# Patient Record
Sex: FEMALE | Race: AMERICAN INDIAN OR ALASKA NATIVE | NOT HISPANIC OR LATINO | URBAN - METROPOLITAN AREA
[De-identification: names, ages, dates, MRNs, and addresses within clinical notes are randomized per-mention and may not be internally consistent; named-entity substitution may affect disease eponyms.]

---

## 2024-05-12 ENCOUNTER — HOSPITAL ENCOUNTER (INPATIENT)
Facility: MEDICAL CENTER | Age: 19
LOS: 2 days | End: 2024-05-14
Attending: OBSTETRICS & GYNECOLOGY | Admitting: OBSTETRICS & GYNECOLOGY
Payer: MEDICAID

## 2024-05-12 LAB
ABO GROUP BLD: NORMAL
BASOPHILS # BLD AUTO: 0.7 % (ref 0–1.8)
BASOPHILS # BLD: 0.08 K/UL (ref 0–0.12)
BLD GP AB SCN SERPL QL: NORMAL
EOSINOPHIL # BLD AUTO: 0.04 K/UL (ref 0–0.51)
EOSINOPHIL NFR BLD: 0.3 % (ref 0–6.9)
ERYTHROCYTE [DISTWIDTH] IN BLOOD BY AUTOMATED COUNT: 52 FL (ref 35.9–50)
ERYTHROCYTE [DISTWIDTH] IN BLOOD BY AUTOMATED COUNT: 52.3 FL (ref 35.9–50)
HBV SURFACE AG SER QL: ABNORMAL
HCT VFR BLD AUTO: 28 % (ref 37–47)
HCT VFR BLD AUTO: 31.3 % (ref 37–47)
HCV AB SER QL: ABNORMAL
HGB BLD-MCNC: 10.1 G/DL (ref 12–16)
HGB BLD-MCNC: 8.9 G/DL (ref 12–16)
HIV 1+2 AB+HIV1 P24 AG SERPL QL IA: NORMAL
HOLDING TUBE BB 8507: NORMAL
IMM GRANULOCYTES # BLD AUTO: 0.09 K/UL (ref 0–0.11)
IMM GRANULOCYTES NFR BLD AUTO: 0.8 % (ref 0–0.9)
LYMPHOCYTES # BLD AUTO: 3.58 K/UL (ref 1–4.8)
LYMPHOCYTES NFR BLD: 30.3 % (ref 22–41)
MCH RBC QN AUTO: 27.7 PG (ref 27–33)
MCH RBC QN AUTO: 28 PG (ref 27–33)
MCHC RBC AUTO-ENTMCNC: 31.8 G/DL (ref 32.2–35.5)
MCHC RBC AUTO-ENTMCNC: 32.3 G/DL (ref 32.2–35.5)
MCV RBC AUTO: 86.7 FL (ref 81.4–97.8)
MCV RBC AUTO: 87.2 FL (ref 81.4–97.8)
MONOCYTES # BLD AUTO: 0.84 K/UL (ref 0–0.85)
MONOCYTES NFR BLD AUTO: 7.1 % (ref 0–13.4)
NEUTROPHILS # BLD AUTO: 7.18 K/UL (ref 1.82–7.42)
NEUTROPHILS NFR BLD: 60.8 % (ref 44–72)
NRBC # BLD AUTO: 0 K/UL
NRBC BLD-RTO: 0 /100 WBC (ref 0–0.2)
PLATELET # BLD AUTO: 190 K/UL (ref 164–446)
PLATELET # BLD AUTO: 191 K/UL (ref 164–446)
PMV BLD AUTO: 11 FL (ref 9–12.9)
PMV BLD AUTO: 11 FL (ref 9–12.9)
RBC # BLD AUTO: 3.21 M/UL (ref 4.2–5.4)
RBC # BLD AUTO: 3.61 M/UL (ref 4.2–5.4)
RH BLD: NORMAL
RUBV AB SER QL: 23.8 IU/ML
T PALLIDUM AB SER QL IA: ABNORMAL
WBC # BLD AUTO: 11.8 K/UL (ref 4.8–10.8)
WBC # BLD AUTO: 16.1 K/UL (ref 4.8–10.8)

## 2024-05-12 PROCEDURE — 59409 OBSTETRICAL CARE: CPT | Performed by: OBSTETRICS & GYNECOLOGY

## 2024-05-12 RX ORDER — OXYTOCIN 10 [USP'U]/ML
INJECTION, SOLUTION INTRAMUSCULAR; INTRAVENOUS
Status: ACTIVE
Start: 2024-05-12 | End: 2024-05-12

## 2024-05-12 RX ORDER — IBUPROFEN 800 MG/1
800 TABLET ORAL EVERY 8 HOURS PRN
Status: DISCONTINUED | OUTPATIENT
Start: 2024-05-12 | End: 2024-05-14 | Stop reason: HOSPADM

## 2024-05-12 RX ORDER — METHYLERGONOVINE MALEATE 0.2 MG/ML
0.2 INJECTION INTRAVENOUS
Status: COMPLETED | OUTPATIENT
Start: 2024-05-12 | End: 2024-05-12

## 2024-05-12 RX ORDER — LIDOCAINE HYDROCHLORIDE 10 MG/ML
20 INJECTION, SOLUTION INFILTRATION; PERINEURAL
Status: DISCONTINUED | OUTPATIENT
Start: 2024-05-12 | End: 2024-05-12 | Stop reason: HOSPADM

## 2024-05-12 RX ORDER — IBUPROFEN 800 MG/1
800 TABLET ORAL
Status: COMPLETED | OUTPATIENT
Start: 2024-05-12 | End: 2024-05-12

## 2024-05-12 RX ORDER — SODIUM CHLORIDE, SODIUM LACTATE, POTASSIUM CHLORIDE, CALCIUM CHLORIDE 600; 310; 30; 20 MG/100ML; MG/100ML; MG/100ML; MG/100ML
INJECTION, SOLUTION INTRAVENOUS CONTINUOUS
Status: DISCONTINUED | OUTPATIENT
Start: 2024-05-12 | End: 2024-05-13

## 2024-05-12 RX ORDER — DOCUSATE SODIUM 100 MG/1
100 CAPSULE, LIQUID FILLED ORAL 2 TIMES DAILY PRN
Status: DISCONTINUED | OUTPATIENT
Start: 2024-05-12 | End: 2024-05-14 | Stop reason: HOSPADM

## 2024-05-12 RX ORDER — ACETAMINOPHEN 500 MG
1000 TABLET ORAL EVERY 6 HOURS PRN
Status: DISCONTINUED | OUTPATIENT
Start: 2024-05-12 | End: 2024-05-14 | Stop reason: HOSPADM

## 2024-05-12 RX ORDER — CARBOPROST TROMETHAMINE 250 UG/ML
250 INJECTION, SOLUTION INTRAMUSCULAR
Status: DISCONTINUED | OUTPATIENT
Start: 2024-05-12 | End: 2024-05-12 | Stop reason: HOSPADM

## 2024-05-12 RX ORDER — ACETAMINOPHEN 500 MG
1000 TABLET ORAL
Status: DISCONTINUED | OUTPATIENT
Start: 2024-05-12 | End: 2024-05-12 | Stop reason: HOSPADM

## 2024-05-12 RX ORDER — MISOPROSTOL 200 UG/1
800 TABLET ORAL
Status: COMPLETED | OUTPATIENT
Start: 2024-05-12 | End: 2024-05-12

## 2024-05-12 RX ORDER — OXYTOCIN 10 [USP'U]/ML
10 INJECTION, SOLUTION INTRAMUSCULAR; INTRAVENOUS
Status: DISCONTINUED | OUTPATIENT
Start: 2024-05-12 | End: 2024-05-12 | Stop reason: HOSPADM

## 2024-05-12 RX ORDER — TERBUTALINE SULFATE 1 MG/ML
0.25 INJECTION, SOLUTION SUBCUTANEOUS
Status: DISCONTINUED | OUTPATIENT
Start: 2024-05-12 | End: 2024-05-12 | Stop reason: HOSPADM

## 2024-05-12 RX ORDER — SODIUM CHLORIDE, SODIUM LACTATE, POTASSIUM CHLORIDE, CALCIUM CHLORIDE 600; 310; 30; 20 MG/100ML; MG/100ML; MG/100ML; MG/100ML
INJECTION, SOLUTION INTRAVENOUS PRN
Status: DISCONTINUED | OUTPATIENT
Start: 2024-05-12 | End: 2024-05-14 | Stop reason: HOSPADM

## 2024-05-12 RX ADMIN — ACETAMINOPHEN 1000 MG: 500 TABLET, FILM COATED ORAL at 20:55

## 2024-05-12 RX ADMIN — OXYTOCIN 20 UNITS: 10 INJECTION, SOLUTION INTRAMUSCULAR; INTRAVENOUS at 09:08

## 2024-05-12 RX ADMIN — MISOPROSTOL 800 MCG: 200 TABLET ORAL at 08:42

## 2024-05-12 RX ADMIN — ACETAMINOPHEN 1000 MG: 500 TABLET, FILM COATED ORAL at 11:43

## 2024-05-12 RX ADMIN — IBUPROFEN 800 MG: 800 TABLET, FILM COATED ORAL at 09:22

## 2024-05-12 RX ADMIN — IBUPROFEN 800 MG: 800 TABLET, FILM COATED ORAL at 22:38

## 2024-05-12 RX ADMIN — OXYTOCIN 125 ML/HR: 10 INJECTION, SOLUTION INTRAMUSCULAR; INTRAVENOUS at 10:37

## 2024-05-12 RX ADMIN — METHYLERGONOVINE MALEATE 0.2 MG: 0.2 INJECTION INTRAVENOUS at 09:45

## 2024-05-12 ASSESSMENT — PAIN DESCRIPTION - PAIN TYPE
TYPE: ACUTE PAIN

## 2024-05-12 ASSESSMENT — LIFESTYLE VARIABLES: EVER_SMOKED: NEVER

## 2024-05-12 ASSESSMENT — PATIENT HEALTH QUESTIONNAIRE - PHQ9
1. LITTLE INTEREST OR PLEASURE IN DOING THINGS: NOT AT ALL
2. FEELING DOWN, DEPRESSED, IRRITABLE, OR HOPELESS: NOT AT ALL
SUM OF ALL RESPONSES TO PHQ9 QUESTIONS 1 AND 2: 0

## 2024-05-12 NOTE — PROGRESS NOTES
0827: Pt arrived complaining of painful contractions and urge to push. Pt brought to labor room for assessment. Pt reports she is  with no prenatal care with any of her pregnancies. Pt reports she had a positive pregnancy test in September.     0829: SVE complete. SROM clear fluid.     0836: Dr Bowman at bedside. Delivery of viable female infant.     0841: Delivery of intact placenta.    Pt up to bathroom, brennan care done. Pt able to void.     1104: Pt into wheelchair, up to postpartum with infant in arms. Report to Abigail MACE. POC discussed.

## 2024-05-12 NOTE — CARE PLAN
The patient is Stable - Low risk of patient condition declining or worsening    Shift Goals  Clinical Goals: VSS, pain WDL, lochia WDL    Progress made toward(s) clinical / shift goals:  VSS, lochia light, pain controlled with PRN medications and rest      Problem: Knowledge Deficit - Postpartum  Goal: Patient will verbalize and demonstrate understanding of self and infant care  Outcome: Progressing     Problem: Psychosocial - Postpartum  Goal: Patient will verbalize and demonstrate effective bonding and parenting behavior  Outcome: Progressing       Patient is not progressing towards the following goals:

## 2024-05-12 NOTE — L&D DELIVERY NOTE
Vaginal Delivery Procedure Note:    Cande Harrell is a 18 y.o. , admitted for active labor.  Her labor course was precipitous. Arrived with complete cervical dilation and regular contractions, progressed to  of viable female infant.    PreDelivery Diagnosis:  1. SIUP @ Unknown  2. Lack of prenatal care during pregnancy    PostDelivery Diagnosis:  1. S/p   2. Lack of prenatal care curing pregnancy    Procedure in Detail:  Pt pushing dorsal lithotomy position.  Head delivered easily and restituted towards maternal left.  Anterior shoulder followed easily with gentle downwards pressure followed by the posterior shoulder and body.  female infant delivered @ 08:36.  There was no nuchal cord.  Infant was placed on the maternal abdomen and was stimulated and bulb suctioned.  Cord clamping was delayed x60 seconds then the cord was clamped x2 and cut.  Infant APGARs 8 and 9 at 1 and 5 minutes, respectively.  Birth weight pending.  Cord gases were not sent.  IV pitocin bolus started.  Placenta delivered spontaneously intact at 08:41. 3 Vessel cord.  The vagina and perineum were examined revealing no tears or lacerations.  The uterus was firm with IV pitocin and fundal massage. Patient noted to have continued bleeding from uterus, received 1x IM methergine and 800mg rectal cytotec. Bleeding resolved after continued fundal massage. Pt and infant left bonding in LDR.    EBL 400ml  Anesthesia - None  Sponge and needle counts correct.  Patient tolerated procedure well.    Anticipate routine postpartum care.    Dalia Rojas M.D.  [facility]  PGY-1

## 2024-05-12 NOTE — H&P
"OB H&P:    CC: Active Labor    HPI:  Ms. Cande Harrell is a 18 y.o.  @ Unknown GA who arrived to L&D on 24 at 08:27 with painful contractions, in active labor. She has not had any prenatal care during this pregnancy or either of her two prior pregnancies. Had positive pregnancy test in September.     Patient states that she occasionally receives care for acute concerns with the [facility] but otherwise does not have a PCP.     Contractions: Yes   Loss of fluid: Yes   Vaginal bleeding: No   Fetal movement: present    Patient did not receive PNC during this pregnancy or either of her two prior pregnancies.     PNL:  No prenatal labs on file  Glucola: No prenatal care  GBS unknown    ROS:  Const: denies fevers, general concerns  CV/resp: reports no concerns  GI: denies abd pain, GI concerns  : see HPI  Neuro: denies HA/vision changes    OB History    Para Term  AB Living   3 2       2   SAB IAB Ectopic Molar Multiple Live Births                    # Outcome Date GA Lbr Aneudy/2nd Weight Sex Delivery Anes PTL Lv   3 Current            2 Para            1 Para                GYN: denies STIs, no cervical procedures    History reviewed. No pertinent past medical history.    History reviewed. No pertinent surgical history.    No current facility-administered medications on file prior to encounter.     No current outpatient medications on file prior to encounter.       History reviewed. No pertinent family history.    Social History     Tobacco Use    Smoking status: Never    Smokeless tobacco: Never   Vaping Use    Vaping Use: Never used   Substance Use Topics    Alcohol use: Not Currently    Drug use: Never     PE:  Vitals:    24 0800 24 0900   BP:  116/58   Pulse:  72   Weight: 65.8 kg (145 lb)    Height: 1.651 m (5' 5\")      gen: AAO, experiencing painful contractions in active labor  abd: soft, gravid, NT, EFW unable to assess  Ext: NT, no bilateral lower " extremity edema    SVE: complete @ 08:29 per RN, SROM during SVE with clear fluid  FHT: approximately 8 min of FHT prior to precipitous delivery, unable to assess    A/P: 18 y.o.  @ Unknown GA who arrived to L&D on 24 at 08:27 with painful contractions, in active labor.    #Active Labor  - Patient arrived in active labor, complete cervical dilation on cervical exam, bulging membranes noted with SROM with clear fluid during exam  - Precipitous delivery of viable female infant  - Received 1x IM Methergine and 800mg rectal cytotec for postpartum bleeding, resolved after fundal massage, EBL approximately 400ml    #Lack of Prenatal Care  - Ordered prenatal labs, drawn post delivery given precipitous birth  - Prenatal Labs: Rh pending, RI, HIV neg, TrepAb neg, HBsAg NR, Hepatitis C NR, GC/CT unknown. GBS: unknown. Blood Type: pending    #GBS unknown  - Delivery precipitous, did not receive antibiotics prior to delivery    #HCM: Did not receive tdap or influenza vaccines    Dalia Rojas M.D.  PGY-1  [facility]

## 2024-05-12 NOTE — PROGRESS NOTES
Patient arrive to S315 via wheelchair with L&D RN. Report received from Faiza DARLING RN. Assessment complete. Denies pain at this time. Patient oriented to room, call light, infant security, emergency light, and unit routine. Encourage to call for assistance. Visiting policy discussed.

## 2024-05-13 RX ADMIN — ACETAMINOPHEN 1000 MG: 500 TABLET, FILM COATED ORAL at 04:12

## 2024-05-13 RX ADMIN — ACETAMINOPHEN 1000 MG: 500 TABLET, FILM COATED ORAL at 09:06

## 2024-05-13 RX ADMIN — IBUPROFEN 800 MG: 800 TABLET, FILM COATED ORAL at 19:43

## 2024-05-13 ASSESSMENT — EDINBURGH POSTNATAL DEPRESSION SCALE (EPDS)
I HAVE BEEN SO UNHAPPY THAT I HAVE HAD DIFFICULTY SLEEPING: NOT AT ALL
I HAVE FELT SAD OR MISERABLE: NO, NOT AT ALL
I HAVE BEEN ABLE TO LAUGH AND SEE THE FUNNY SIDE OF THINGS: AS MUCH AS I ALWAYS COULD
THE THOUGHT OF HARMING MYSELF HAS OCCURRED TO ME: NEVER
I HAVE FELT SCARED OR PANICKY FOR NO GOOD REASON: NO, NOT AT ALL
I HAVE BEEN SO UNHAPPY THAT I HAVE BEEN CRYING: NO, NEVER
THINGS HAVE BEEN GETTING ON TOP OF ME: NO, MOST OF THE TIME I HAVE COPED QUITE WELL
I HAVE LOOKED FORWARD WITH ENJOYMENT TO THINGS: AS MUCH AS I EVER DID
I HAVE BEEN ANXIOUS OR WORRIED FOR NO GOOD REASON: HARDLY EVER
I HAVE BLAMED MYSELF UNNECESSARILY WHEN THINGS WENT WRONG: NO, NEVER

## 2024-05-13 ASSESSMENT — PAIN DESCRIPTION - PAIN TYPE
TYPE: ACUTE PAIN

## 2024-05-13 NOTE — CARE PLAN
The patient is Stable - Low risk of patient condition declining or worsening    Shift Goals  Clinical Goals: remain clinically stable  Patient Goals: pain management, rest  Family Goals: NAVEEN    Progress made toward(s) clinical / shift goals:  Patient has scant to light lochia with a firm palpable uterus.  Vital signs are within defined limits.  Patient will ask for pain medication when needed.  Assessment will continue.     Patient is not progressing towards the following goals:

## 2024-05-13 NOTE — CARE PLAN
The patient is Stable - Low risk of patient condition declining or worsening    Shift Goals  Clinical Goals: fundus and lochia WNL, pain management  Patient Goals: pain management, rest  Family Goals: NAVEEN    Progress made toward(s) clinical / shift goals: Fundus firm, lochia scant. Pt's pain managed with prn pain medications and non-pharmacological methods. Pt resting.    Problem: Psychosocial - Postpartum  Goal: Patient will verbalize and demonstrate effective bonding and parenting behavior  Outcome: Progressing     Problem: Altered Physiologic Condition  Goal: Patient physiologically stable as evidenced by normal lochia, palpable uterine involution and vitals within normal limits  Outcome: Progressing     Patient is not progressing towards the following goals: NA

## 2024-05-13 NOTE — DISCHARGE PLANNING
Discharge Planning Assessment Post Partum    Completed chart review    Reason for Referral: No prenatal care  Address: [address] in [place]  Type of Living Situation:stable - MOB lives with her  mother  Mom Diagnosis: post partum  Baby Diagnosis:   Primary Language: english    Name of Baby: Adeline Soriano  Father of the Baby: not involved  Involved in baby’s care? no  Contact Information: no    Prenatal Care: no - mother has 2 other children and did not have transportation to get PNC - discussed importance of care for . Mother expressed understanding.   [facility]'s PCP: [facility]  PCP for new baby:[facility] - discussed establishing other children with [facility] as well    Support System: family  Coping/Bonding between mother & baby: appropriate  Source of Feeding: breast  Supplies for Infant: prepared    Mom's Insurance: Medicaid, [employer]  Baby Covered on Insurance:yes  Mother Employed/School: no  Other children in the home/names & ages: Marek 1 year old, Isa 2 year old    Financial Hardship/Income: MOB mother support , receives TANF, Foodstamps, plans to apply for WIC  Mom's Mental status: alert and oriented  Services used prior to admit: TANF foodstamps, Medicaid     C[facility] History: [facility]. She denies any allegation of abuse/neglect  Psychiatric History: denies  Domestic Violence History: denies  Drug/ETOH History: denies    Resources Provided: community resources, PP support, [facility], [facility]  Referrals Made: diaper bank     Clearance for Discharge: Plan for infant to discharge home to parent when ready.

## 2024-05-13 NOTE — PROGRESS NOTES
Assessment complete, pt resting in bed at this time. Fundus firm, lochia scant. Pain controlled with prn medications per MAR. Pt states voiding without difficulty. POC discussed. Call light in reach of pt, encouraged to call for assistance.

## 2024-05-13 NOTE — LACTATION NOTE
Attempted to visit patient but she was asleep. Her nurse Darlyn reports that she has bottle fed formula to her baby so far today.Darlyn will notify me when Cande awakens.

## 2024-05-13 NOTE — PROGRESS NOTES
Obstetrics & Gynecology Post-Delivery Progress Note    Date of Service  2024    18 y.o.  s/p vaginal, spontaneous  Delivery date: 2024    Events  Patient had no prenatal care for this pregnancy, delivered precipitously.     Subjective  Pain: No  Bleeding: lochia minimal  PO's: taking regular diet  Voiding: without difficulty  Ambulating: yes  Passing flatus: Yes  Feeding: bottlefeeding    Objective  Temp:  [36.4 °C (97.6 °F)-36.8 °C (98.3 °F)] 36.4 °C (97.6 °F)  Pulse:  [61-73] 68  Resp:  [16-17] 16  BP: ()/(53-69) 105/60  SpO2:  [95 %-98 %] 95 %    Physical Exam  General: well  Chest/Breasts: nipples intact   Abdomen: non-distended  Fundus: at umbilicus  Incision: not applicable, (vaginal delivery)  Perineum: deferred  Extremities: symmetric, calves nontender    Recent Labs     24  0845 24  1851   WBC 11.8* 16.1*   RBC 3.61* 3.21*   HEMOGLOBIN 10.1* 8.9*   HEMATOCRIT 31.3* 28.0*   MCV 86.7 87.2   MCH 28.0 27.7   RDW 52.3* 52.0*   PLATELETCT 191 190   MPV 11.0 11.0   NEUTSPOLYS 60.80  --    LYMPHOCYTES 30.30  --    MONOCYTES 7.10  --    EOSINOPHILS 0.30  --    BASOPHILS 0.70  --        Assessment/Plan  Cande Harrell is a 18 y.o.yo  s/p postpartum day #1  s/p vaginal, spontaneous    1. Post care: meeting all goals  2. Hemodynamics: stable  3. Pain:  reports abdominal pain that is   4. Rh+, Rubella Immune  5. Method of Feeding: plans to bottle feed  6. Method of Contraception:  has not considered, though does not want more children. Discussed options for LARCs. Please disc tomorrow before discharge.  7. Disposition: likely home postpartum day 1    VTE prophylaxis: none indicated

## 2024-05-14 VITALS
HEART RATE: 73 BPM | HEIGHT: 65 IN | DIASTOLIC BLOOD PRESSURE: 60 MMHG | RESPIRATION RATE: 16 BRPM | WEIGHT: 145 LBS | OXYGEN SATURATION: 98 % | SYSTOLIC BLOOD PRESSURE: 98 MMHG | BODY MASS INDEX: 24.16 KG/M2 | TEMPERATURE: 97.8 F

## 2024-05-14 PROCEDURE — RXMED WILLOW AMBULATORY MEDICATION CHARGE: Performed by: OBSTETRICS & GYNECOLOGY

## 2024-05-14 RX ORDER — FERROUS SULFATE 325(65) MG
325 TABLET ORAL
Qty: 15 TABLET | Refills: 0 | Status: SHIPPED | OUTPATIENT
Start: 2024-05-14 | End: 2024-06-13

## 2024-05-14 RX ORDER — MEDROXYPROGESTERONE ACETATE 150 MG/ML
150 INJECTION, SUSPENSION INTRAMUSCULAR ONCE
Status: DISCONTINUED | OUTPATIENT
Start: 2024-05-14 | End: 2024-05-14 | Stop reason: HOSPADM

## 2024-05-14 RX ORDER — IBUPROFEN 800 MG/1
800 TABLET ORAL EVERY 8 HOURS PRN
Qty: 30 TABLET | Refills: 0 | Status: SHIPPED | OUTPATIENT
Start: 2024-05-14 | End: 2024-05-14

## 2024-05-14 RX ORDER — ASCORBIC ACID 500 MG
500 TABLET ORAL
Qty: 15 TABLET | Refills: 0 | Status: SHIPPED | OUTPATIENT
Start: 2024-05-14 | End: 2024-06-13

## 2024-05-14 RX ORDER — ACETAMINOPHEN 500 MG
1000 TABLET ORAL EVERY 6 HOURS PRN
Qty: 30 TABLET | Refills: 0 | Status: SHIPPED | OUTPATIENT
Start: 2024-05-14

## 2024-05-14 RX ORDER — ACETAMINOPHEN 500 MG
1000 TABLET ORAL EVERY 6 HOURS PRN
Qty: 30 TABLET | Refills: 0 | Status: SHIPPED | OUTPATIENT
Start: 2024-05-14 | End: 2024-05-14

## 2024-05-14 RX ORDER — FERROUS SULFATE 325(65) MG
325 TABLET ORAL
Qty: 15 TABLET | Refills: 0 | Status: SHIPPED | OUTPATIENT
Start: 2024-05-14 | End: 2024-05-14

## 2024-05-14 RX ORDER — ASCORBIC ACID 500 MG
500 TABLET ORAL
Qty: 15 TABLET | Refills: 0 | Status: SHIPPED | OUTPATIENT
Start: 2024-05-14 | End: 2024-05-14

## 2024-05-14 RX ORDER — IBUPROFEN 800 MG/1
800 TABLET ORAL EVERY 8 HOURS PRN
Qty: 30 TABLET | Refills: 0 | Status: SHIPPED | OUTPATIENT
Start: 2024-05-14

## 2024-05-14 RX ORDER — POLYETHYLENE GLYCOL 3350 17 G/17G
17 POWDER, FOR SOLUTION ORAL
Qty: 10 EACH | Refills: 0 | Status: SHIPPED | OUTPATIENT
Start: 2024-05-14

## 2024-05-14 RX ADMIN — IBUPROFEN 800 MG: 800 TABLET, FILM COATED ORAL at 08:37

## 2024-05-14 RX ADMIN — ACETAMINOPHEN 1000 MG: 500 TABLET, FILM COATED ORAL at 02:14

## 2024-05-14 ASSESSMENT — PAIN DESCRIPTION - PAIN TYPE: TYPE: ACUTE PAIN

## 2024-05-14 NOTE — DISCHARGE INSTRUCTIONS
PATIENT DISCHARGE EDUCATION INSTRUCTION SHEET  REASONS TO CALL YOUR PEDIATRICIAN  Projectile or forceful vomiting for more than one feeding  Unusual rash lasting more than 24 hours  Very sleepy, difficult to wake up  Bright yellow or pumpkin colored skin with extreme sleepiness  Temperature below 97.6 or above 100.4 F rectally  Feeding problems  Breathing problems  Excessive crying with no known cause  If cord starts to become red, swollen, develops a smell or discharge  No wet diaper or stool in a 24 hour time period     REASONS TO CALL YOUR OBSTETRICIAN  Persistent fever, shaking, chills (Temperature higher than 100.4) may indicate you have an infection  Heavy bleeding: soaking more than 1 pad per hour; Passing clots an egg-sized clot or bigger may mean you have an postpartum hemorrhage  Foul odor from vagina or bad smelling or discolored discharge or blood  Breast infection (Mastitis symptoms); breast pain, chills, fever, redness or red streaks, may feel flu like symptoms  Urinary pain, burning or frequency  Incision that is not healing, increased redness, swelling, tenderness or pain, or any pus from episiotomy or  site may mean you have an infection  Redness, swelling, warmth, or painful to touch in the calf area of your leg may mean you have a blood clot  Severe or intensified depression, thoughts or feelings of wanting to hurt yourself or someone else   Pain in chest, obstructed breathing or shortness of breath (trouble catching your breath) may mean you are having a postpartum complication. Call your provider immediately   Headache that does not get better, even after taking medicine, a bad headache with vision changes or pain in the upper right area of your belly may mean you have high blood pressure or post birth preeclampsia. Call your provider immediately    SAFE SLEEP POSITIONING FOR YOUR BABY  The [employer] advises your baby should be placed on his/her back for Sleeping  to reduce the risk of Sudden Infant Death Syndrome (SIDS)  Baby should sleep by themselves in a crib, portable crib or bassinet  Baby should not share a bed with his/her parents  Baby should be placed on his or her back to sleep, night time and at naps  Baby should sleep on firm mattress with a tightly fitted sheet  NO couches, waterbeds or anything soft  Baby's sleep area should not contain any loose blankets, comforters, stuffed animals or any other soft items, (pillows, bumper pads, etc. ...)  Baby's face should be kept uncovered at all times  Baby should sleep in a smoke-free environment  Do not dress baby too warmly to prevent overheating    HAND WASHING  All family and friends should wash their hands:  Before and after holding the baby  Before feeding the baby  After using the restroom or changing the baby's diaper     CARE    TAKING BABY'S TEMPERATURE  If you feel your baby may have a fever take your baby's temperature per thermometer instructions  If taking axillary temperature place thermometer under baby's armpit and hold arm close to body  The most precise and accurate way to take a temperature is rectally  Turn on the digital thermometer and lubricate the tip of the thermometer with petroleum jelly.  Lay your baby or child on his or her back, lift his or her thighs, and insert the lubricated thermometer 1/2 to 1 inch (1.3 to 2.5 centimeters) into the rectum  Call your Pediatrician for temperature lower than 97.6 or greater than 100.4 F rectally    BATHE AND SHAMPOO BABY  Gently wash baby with a soft cloth using warm water and mild soap - rinse well  Do not put baby in tub bath until umbilical cord falls off and appears well-healed  Bathing baby 2-3 times a week might be enough until your baby becomes more mobile. Bathing your baby too much can dry out his or her skin     NAIL CARE  First recommendation is to keep them covered to prevent facial scratching  During the first few weeks,  nails  are very soft. Doctors recommend using only a fine emery board. Don't bite or tear your baby's nails. When your baby's nails are stronger, after a few weeks, you can switch to clippers or scissors making sure not to cut too short and nip the quick   A good time for nail care is while your baby is sleeping and moving less    CORD CARE  Fold diaper below umbilical cord until cord falls off  Keep umbilical cord clean and dry  May see a small amount of crust around the base of the cord. Clean off with mild soap and water and dry             DIAPER AND DRESS BABY  For baby girls: gently wipe from front to back. Mucous or pink tinged drainage is normal  For uncircumcised baby boys: do NOT pull back the foreskin to clean the penis. Gently clean with wipes or warm, soapy water  Dress baby in one more layer of clothing than you are wearing  Use a hat to protect from sun or cold. NO ties or drawstrings    URINATION AND BOWEL MOVEMENTS  If formula feeding or when breast milk feeding is established, your baby should wet 6-8 diapers a day and have at least 2 bowel movements a day during the first month  Bowel movements color and type can vary from day to day    CIRCUMCISION  What to watch out for:  Foul smelling discharge  Fever  Swelling   Crusty, fluid filled sores  Trouble urinating   Persistent bleeding or more than a quarter size spot of blood on his diaper  Yellow discharge lasting more than a week  Continue with care procedures until healed or have a visit with your Pediatrician     INFANT FEEDING  Most newborns feed 8-12 times, every 24 hours. YOU MAY NEED TO WAKE YOUR BABY UP TO FEED  If breastfeeding, offer both breasts when your baby is showing feeding cues, such as rooting or bringing hand to mouth and sucking  Common for  babies to feed every 1-3 hours   Only allow baby to sleep up to 4 hours in between feeds if baby is feeding well at each feed. Offer breast anytime baby is showing feeding cues and at  least every 3 hours  Follow up with outpatient [facility] for continued breast feeding support    FORMULA FEEDING  Feed baby formula every 2-3 hours when your baby is showing feeding cues  Paced bottle feeding will help baby not over eat at each feed     BOTTLE FEEDING   Paced Bottle Feeding is a method of bottle feeding that allows the infant to be more in control of the feeding pace. This feeding method slows down the flow of milk into the nipple and the mouth, allowing the baby to eat more slowly, and take breaks. Paced feeding reduces the risk of overfeeding that may result in discomfort for the baby   Hold baby almost upright or slightly reclined position supporting the head and neck  Use a small nipple for slow-flowing. Slow flow nipple holes help in controlling flow   Don't force the bottle's nipple into your baby's mouth. Tickle babies lip so baby opens their mouth  Insert nipple and hold the bottle flat  Let the baby suck three to four times without milk then tip the bottle just enough to fill the nipple about shelter with milk  Let baby suck 3-5 continuous swallows, about 20-30 seconds tip the bottle down to give the baby a break  After a few seconds, when the baby begins to suck again, tip bottle up to allow milk to flow into the nipple  Continue to Pace feed until baby shows signs of fullness; no longer sucking after a break, turning away or pushing away the nipple   Bottle propping is not a recommended practice for feeding  Bottle propping is when you give a baby a bottle by leaning the bottle against a pillow, or other support, rather than holding the baby and the bottle.  Forces your baby to keep up with the flow, even if the baby is full   This can increase your baby's risk of choking, ear infections, and tooth decay    BOTTLE PREPARATION   Never feed  formula to your baby, or use formula if the container is dented  When using ready-to-feed, shake formula containers before  "opening  If formula is in a can, clean the lid of any dust, and be sure the can opener is clean  Formula does not need to be warmed. If you choose to feed warmed formula, do not microwave it. This can cause \"hot spots\" that could burn your baby. Instead, set the filled bottle in a bowl of warm (not boiling) water or hold the bottle under warm tap water. Sprinkle a few drops of formula on the inside of your wrist to make sure it's not too hot  Measure and pour desired amount of water into baby bottle  Add unpacked, level scoop(s) of powder to the bottle as directed on formula container. Return dry scoop to can  Put the cap on the bottle and shake. Move your wrist in a twisting motion helps powder formula mix more quickly and more thoroughly  Feed or store immediately in refrigerator  You need to sterilize bottles, nipples, rings, etc., only before the first use    CLEANING BOTTLE  Use hot, soapy water  Rinse the bottles and attachments separately and clean with a bottle brush  If your bottles are labelled  safe, you can alternatively go ahead and wash them in the    After washing, rinse the bottle parts thoroughly in hot running water to remove any bubbles or soap residue   Place the parts on a bottle drying rack   Make sure the bottles are left to drain in a well-ventilated location to ensure that they dry thoroughly  CAR SEAT  For your baby's safety and to comply with [facility] Law you will need to bring a car seat to the hospital before taking your baby home. Please read your car seat instructions before your baby's discharge from the hospital.  Make sure you place an emergency contact sticker on your baby's car seat with your baby's identifying information  Car seat should not be placed in the front seat of a vehicle. The car seat should be placed in the back seat in the rear-facing position.  Car seat information is available through [employer] Se[employer] at [phone] and also at " [email or web address]    MATERNAL CARE     WOUND CARE  Ask your physician for additional care instructions. In general:   Incision:  May shower and pat incision dry   Keep the incision clean and dry  There should not be any opening or pus from the incision  Continue to walk at home 3 times a day   Do NOT lift anything heavier than your baby (over 10 pounds)  Encourage family to help participate in care of the  to allow rest and mom time to heal    Episiotomy/Laceration  May use brennan-spray bottle, witch hazel pads and dermaplast spray for comfort  Use brennan-spray bottle after urinating to cleanse perineal area  To prevent burning during urination spray brennan-water bottle on labial area   Pat perineal area dry until episiotomy/laceration is healed  Continue to use brennan-bottle until bleeding stops as needed  If have a 2nd degree laceration or greater, a Sitz bath can offer relief from soreness, burning, and inflammation   Sitz Bath   Sit in 6 inches of warm water and soak laceration as needed until the laceration heals    VAGINAL CARE AND BLEEDING  Nothing inside vagina for 6 weeks:   No sexual intercourse, tampons or douching  Bleeding may continue for 2-4 weeks. Amount and color may vary  Soaking 1 pad or more in an hour for several hours is considered heavy bleeding  Passing large egg sized blood clots can be concerning  If you feel like you have heavy bleeding or are having increasing amount of blood clots call your Obstetrician immediately  If you begin feeling faint upon standing, feeling sick to your stomach, have clammy skin, a really fast heartbeat, have chills, start feeling confused, dizzy, sleepy or weak, or feeling like you're going to faint call your Obstetrician immediately    HYPERTENSION   Preeclampsia or gestational hypertension are types of high blood pressure that only pregnant women can get. It is important for you to be aware of symptoms to seek early intervention and treatment. If you  "have any of these symptoms immediately call your Obstetrician    Vision changes or blurred vision   Severe headache or pain that is unrelieved with medication and will not go away  Persistent pain in upper abdomen or shoulder   Increased swelling of face, feet, or hands  Difficulty breathing or shortness of breath at rest  Urinating less than usual    URINATION AND BOWEL MOVEMENTS  Eating more fiber (bran cereal, fruits, and vegetables) and drinking plenty of fluids will help to avoid constipation  Urinary frequency and urgency after childbirth is normal  If you experience any urinary pain, burning or frequency call your provider    BIRTH CONTROL  It is possible to become pregnant at any time after delivery and while breastfeeding  Plan to discuss a method of birth control with your physician at your post-delivery follow up visit    POSTPARTUM BLUES  During the first few days after birth, you may experience a sense of the \"blues\" which may include impatience, irritability or even crying. These feelings come and go quickly. However, as many as 1 in 10 women experience emotional symptoms known as postpartum depression.     POSTPARTUM DEPRESSION    May start as early as the second or third day after delivery or take several weeks or months to develop. Symptoms of \"blues\" are present, but are more intense: Crying spells; loss of appetite; feelings of hopelessness or loss of control; fear of touching the baby; over concern or no concern at all about the baby; little or no concern about your own appearance/caring for yourself; and/or inability to sleep or excessive sleeping. Contact your Obstetrician if you are experiencing any of these symptoms     PREVENTING SHAKEN BABY  If you are angry or stressed, PUT THE BABY IN THE CRIB, step away, take some deep breaths, and wait until you are calm to care for the baby. DO NOT SHAKE THE BABY. You are not alone, call a supporter for help.  [facility] 24/7 crisis call line " "([phone]) or (1-547.606.3251)  You can also text them, text \"ANSWER\" (888751)      "

## 2024-05-14 NOTE — CARE PLAN
Problem: Pain - Standard  Goal: Alleviation of pain or a reduction in pain to the patient’s comfort goal  Outcome: Progressing     Problem: Altered Physiologic Condition  Goal: Patient physiologically stable as evidenced by normal lochia, palpable uterine involution and vitals within normal limits  Outcome: Progressing   The patient is Stable - Low risk of patient condition declining or worsening    Shift Goals  Clinical Goals: lochia WDL; pain control  Patient Goals: pain management, rest  Family Goals: NAVEEN    Progress made toward(s) clinical / shift goals:  pt lochia remains WDL. Pt pain has been managed with PRN pain medication and rest. Pt has been caring for self and baby.     Patient is not progressing towards the following goals:

## 2024-05-14 NOTE — PROGRESS NOTES
Report received from Darlyn MACE. Pt assessment complete. Reviewed POC for this evening including pain medication available to her. Pt has been mainly bottle feeding baby. Advised pt to call for assistance if she decides to breastfeed. Call light is within reach of pt.

## 2024-05-14 NOTE — PROGRESS NOTES
Discharged home with baby. Vss instructions given on infant care and safety, self care and reasons to call the .

## 2024-05-14 NOTE — CARE PLAN
The patient is Stable - Low risk of patient condition declining or worsening    Shift Goals  Clinical Goals: lochia light wdl  Patient Goals: pain management, rest  Family Goals: NAVEEN    Progress made toward(s) clinical / shift goals:  vss ambulating taking care of self independently.     Patient is not progressing towards the following goals:

## 2024-05-14 NOTE — DISCHARGE SUMMARY
Discharge Summary:     Date of Admission: 2024  Date of Discharge: 24    Admitting diagnosis:    1. Pregnancy @ Unknown  2. No prenatal care  3. GBS unknown    Discharge Diagnosis:   1. Status post vaginal, spontaneous.  2. No prenatal care  3. GBS unknown, did not receive treatment prior to delivery    History reviewed. No pertinent past medical history.  OB History    Para Term  AB Living   3 3       3   SAB IAB Ectopic Molar Multiple Live Births           0 1      # Outcome Date GA Lbr Aneudy/2nd Weight Sex Delivery Anes PTL Lv   3 Para 24  / 00:07 2.86 kg (6 lb 4.9 oz) F Vag-Spont None N CHRISTOPH   2 Para            1 Para              History reviewed. No pertinent surgical history.  Patient has no known allergies.    Patient Active Problem List   Diagnosis    Indication for care in labor or delivery       Hospital Course:   Pt is a 18 y.o. now  who presented for active labor, did not receive prenatal care during her pregnancy, unknown gestational age.  Patient was noted to be completely dilated with bulging membranes on cervical check, had SROM during this check at that time.  Progressed rapidly to  of viable female infant on  at 08:36AM.  Patient was meeting all goals postpartum.  Social work consulted for lack of prenatal care, cleared infant for discharge home with patient when medically stable.  Medically cleared for discharge on 2024.      Physical Exam:  Temp:  [36.4 °C (97.6 °F)-36.6 °C (97.8 °F)] 36.6 °C (97.8 °F)  Pulse:  [73-79] 73  Resp:  [16-18] 16  BP: (94-98)/(56-60) 98/60  SpO2:  [98 %] 98 %  Physical Exam  General: well and resting  Abdomen: nontender, normal bowel sounds, soft  Fundus: firm, below umbilicus, and nontender  Incision: not applicable, (vaginal delivery)  Extremities: symmetric and no edema, calves nontender    Current Facility-Administered Medications   Medication Dose    medroxyPROGESTERone (Depo-Provera) injection 150 mg  150 mg     oxytocin (Pitocin) infusion (for post delivery)  125 mL/hr    lactated ringers infusion      docusate sodium (Colace) capsule 100 mg  100 mg    ibuprofen (Motrin) tablet 800 mg  800 mg    acetaminophen (Tylenol) tablet 1,000 mg  1,000 mg    tetanus-dipth-acell pertussis (Tdap) inj 0.5 mL  0.5 mL       Recent Labs     05/12/24  0845 05/12/24  1851   WBC 11.8* 16.1*   RBC 3.61* 3.21*   HEMOGLOBIN 10.1* 8.9*   HEMATOCRIT 31.3* 28.0*   MCV 86.7 87.2   MCH 28.0 27.7   MCHC 32.3 31.8*   RDW 52.3* 52.0*   PLATELETCT 191 190   MPV 11.0 11.0     Activity/ Discharge Instructions::   Discharge to home  Pelvic Rest x 6 weeks  No heavy lifting x4 weeks  Call or come to ED for: heavy vaginal bleeding, fever >100.4, severe abdominal pain, severe headache, chest pain, shortness of breath,  N/V, incisional drainage, or other concerns.    Patient with anemia noted on CBC, will begin ferrous sulfate 325mcg daily with ascorbic acid for absorption every 48 hours.      Follow up:  [facility] in 5 weeks for vaginal delivery. Patient scheduling infant to follow up with [facility], first appointment tomorrow. Encouraged patient to establish care with [facility] for PCP as well. Patient states plan to bring her other two children to [facility] for care.     Patient desires Depo-provera injection today prior to discharge. Will plan to follow-up with OB at 6 week follow-up visit regarding LARC.      Discharge Meds:   Current Outpatient Medications   Medication Sig Dispense Refill    acetaminophen (TYLENOL) 500 MG Tab Take 2 Tablets by mouth every 6 hours as needed for Mild Pain or Moderate Pain. 30 Tablet 0    ibuprofen (MOTRIN) 800 MG Tab Take 1 Tablet by mouth every 8 hours as needed for Mild Pain or Moderate Pain. 30 Tablet 0    ferrous sulfate 325 (65 Fe) MG tablet Take 1 Tablet by mouth every 48 hours for 30 days. 15 Tablet 0    ascorbic acid (VITAMIN C) 500 MG tablet Take 1 Tablet by mouth every 48 hours for 30 days. 15 Tablet 0       Dalia B  White, M.D.  PGY-1  [facility]

## 2024-05-15 ENCOUNTER — PHARMACY VISIT (OUTPATIENT)
Dept: PHARMACY | Facility: MEDICAL CENTER | Age: 19
End: 2024-05-15
Payer: COMMERCIAL

## 2025-01-25 ENCOUNTER — HOSPITAL ENCOUNTER (EMERGENCY)
Facility: MEDICAL CENTER | Age: 20
End: 2025-01-25
Attending: EMERGENCY MEDICINE
Payer: MEDICAID

## 2025-01-25 VITALS
BODY MASS INDEX: 19.48 KG/M2 | HEIGHT: 61 IN | RESPIRATION RATE: 16 BRPM | TEMPERATURE: 99.5 F | OXYGEN SATURATION: 98 % | SYSTOLIC BLOOD PRESSURE: 100 MMHG | DIASTOLIC BLOOD PRESSURE: 58 MMHG | HEART RATE: 93 BPM | WEIGHT: 103.17 LBS

## 2025-01-25 DIAGNOSIS — J06.9 UPPER RESPIRATORY TRACT INFECTION, UNSPECIFIED TYPE: ICD-10-CM

## 2025-01-25 DIAGNOSIS — R05.1 ACUTE COUGH: ICD-10-CM

## 2025-01-25 PROCEDURE — 99282 EMERGENCY DEPT VISIT SF MDM: CPT

## 2025-01-25 NOTE — ED PROVIDER NOTES
"  ER Provider Note    Scribed for Blanca Hagan M.d. by Mariam Gupta. 1/25/2025  1:37 PM    Primary Care Provider: Pcp Unknown    CHIEF COMPLAINT  Chief Complaint   Patient presents with    Cough     Pt reports a productive cough since 1/22       LIMITATION TO HISTORY   Select: : None    HPI/ROS  OUTSIDE HISTORIAN(S):  Parent Patient's mom is present at bedside.    EXTERNAL RECORDS REVIEWED  Inpatient Notes delivery note from May of this year reviewed patient was a G3, P3    Cande Roxana Harrell is a 19 y.o. female who presents to the ED for evaluation of a productive cough onset approximately 3 days ago with associated rib pain and fevers. She states that she woke up with worsening symptoms this morning, prompting her visit. Patient reports that she has been taking cough syrup and using pain medication with little alleviation. Denies any other pain or symptoms.     PAST MEDICAL HISTORY  No past medical history noted.    SURGICAL HISTORY  No past surgical history noted.    FAMILY HISTORY  No family history noted.    SOCIAL HISTORY   reports that she has never smoked. She has never used smokeless tobacco. She reports that she does not currently use alcohol. She reports that she does not use drugs.    CURRENT MEDICATIONS  Previous Medications    ACETAMINOPHEN (TYLENOL) 500 MG TAB    Take 2 Tablets by mouth every 6 hours as needed for Mild Pain or Moderate Pain.    IBUPROFEN (MOTRIN) 800 MG TAB    Take 1 Tablet by mouth every 8 hours as needed for Mild Pain or Moderate Pain.    POLYETHYLENE GLYCOL/LYTES (MIRALAX) PACK    Mix 1 Packet per package instructions and drink by mouth 1 time a day as needed (constipation).       ALLERGIES  Patient has no known allergies.    PHYSICAL EXAM  BP (!) 120/102   Pulse 95   Temp 37.5 °C (99.5 °F) (Temporal)   Resp 16   Ht 1.549 m (5' 1\")   Wt 46.8 kg (103 lb 2.8 oz)   SpO2 95%   BMI 19.49 kg/m²   Constitutional: Alert in no apparent distress.  HENT: No signs of " trauma, Bilateral external ears normal, Nose normal.   Eyes: Pupils are equal and reactive, Conjunctiva normal, Non-icteric.   Neck:  No stridor.   Cardiovascular: Regular rate and rhythm, no murmurs.   Thorax & Lungs: Normal breath sounds, No respiratory distress, No wheezing, No chest tenderness.   Abdomen: Bowel sounds normal, Soft, No tenderness, No masses, No peritoneal signs.  Skin: Warm, Dry, No erythema, No rash.   Musculoskeletal:  No major deformities noted.  Neurologic: Alert, moving all extremities without difficulty, no focal deficits.     COURSE & MEDICAL DECISION MAKING    ED Observation Status? No; Patient does not meet criteria for ED Observation.     1:37 PM - Patient seen and evaluated at bedside. Discussed the plan for discharge, including recommendations for helpful over the counter medications. She understands and agrees to the plan of care.     INITIAL ASSESSMENT AND PLAN  Care Narrative: This is a 19-year-old female that presents with a cough.  She has clear lungs on exam no wheezing I do not think she has evidence of an asthma exacerbation.  I suspect that she has a viral upper respiratory infection given her symptoms and children at home with similar symptoms.  She is not hypoxic.  I do not think she needs any imaging.  I recommended continued supportive care and patient will be discharged.                   DISPOSITION AND DISCUSSIONS  I have discussed management of the patient with the following physicians and LENNIE's: None    Discussion of management with other QHP or appropriate source(s): None     Escalation of care considered, and ultimately not performed: diagnostic imaging.    Barriers to care at this time, including but not limited to: Patient does not have established PCP.     Decision tools and prescription drugs considered including, but not limited to: Antibiotics not indicated .    The patient will return for new or worsening symptoms and is stable at the time of discharge.  Patient was given return precautions. Patient and/or family member verbalizes understanding and will comply.    DISPOSITION:  Patient will be discharged home in stable condition.    FOLLOW UP:  [facility], Emergency Dept  [address] [id number] [phone]    Return to the emergency department for increasing difficulty breathing, fevers or other concerns.      OUTPATIENT MEDICATIONS:  Discharge Medication List as of 1/25/2025  1:52 PM           FINAL IMPRESSION   1. Acute cough    2. Upper respiratory tract infection, unspecified type        I, Mariam Luz), am scribing for, and in the presence of, Blanca Hagan M.D..    Electronically signed by: Mariam Luz), 1/25/2025    IBlanca M.D. personally performed the services described in this documentation, as scribed by Mariam Gupta in my presence, and it is both accurate and complete.    The note accurately reflects work and decisions made by me.  Blanca Hagan M.D.  1/25/2025  2:56 PM

## 2025-01-25 NOTE — ED TRIAGE NOTES
Cande Harrell  19 y.o. female  Chief Complaint   Patient presents with    Cough     Pt reports a productive cough since 1/22         Pt amb to triage with steady gait for above complaint. \  Pt is alert and oriented, speaking in full sentences, follows commands and responds appropriately to questions. Not in any apparent distress. Respirations are even and unlabored.  Pt placed in ED Lobby. Pt educated on triage process. Pt encouraged to alert staff for any changes.

## 2025-01-25 NOTE — DISCHARGE INSTRUCTIONS
Take ibuprofen and Tylenol 3 times a day to help reduce your discomfort from this illness.  You can take over-the-counter Mucinex as well to help with management of your cough.  Return for worsening shortness of breath.